# Patient Record
Sex: MALE | Race: WHITE | NOT HISPANIC OR LATINO | Employment: OTHER | URBAN - METROPOLITAN AREA
[De-identification: names, ages, dates, MRNs, and addresses within clinical notes are randomized per-mention and may not be internally consistent; named-entity substitution may affect disease eponyms.]

---

## 2020-11-11 ENCOUNTER — OFFICE VISIT (OUTPATIENT)
Dept: CARDIOLOGY | Facility: CLINIC | Age: 75
End: 2020-11-11
Payer: MEDICARE

## 2020-11-11 VITALS
SYSTOLIC BLOOD PRESSURE: 136 MMHG | HEIGHT: 74 IN | BODY MASS INDEX: 28.49 KG/M2 | OXYGEN SATURATION: 96 % | DIASTOLIC BLOOD PRESSURE: 80 MMHG | HEART RATE: 60 BPM | RESPIRATION RATE: 16 BRPM | WEIGHT: 222 LBS

## 2020-11-11 DIAGNOSIS — K21.9 GASTROESOPHAGEAL REFLUX DISEASE, UNSPECIFIED WHETHER ESOPHAGITIS PRESENT: ICD-10-CM

## 2020-11-11 DIAGNOSIS — N40.0 BENIGN PROSTATIC HYPERPLASIA, UNSPECIFIED WHETHER LOWER URINARY TRACT SYMPTOMS PRESENT: ICD-10-CM

## 2020-11-11 DIAGNOSIS — E78.5 DYSLIPIDEMIA: Primary | ICD-10-CM

## 2020-11-11 PROCEDURE — 1101F PR PT FALLS ASSESS DOC 0-1 FALLS W/OUT INJ PAST YR: ICD-10-PCS | Mod: CPTII,S$GLB,, | Performed by: INTERNAL MEDICINE

## 2020-11-11 PROCEDURE — 1159F MED LIST DOCD IN RCRD: CPT | Mod: S$GLB,,, | Performed by: INTERNAL MEDICINE

## 2020-11-11 PROCEDURE — 1101F PT FALLS ASSESS-DOCD LE1/YR: CPT | Mod: CPTII,S$GLB,, | Performed by: INTERNAL MEDICINE

## 2020-11-11 PROCEDURE — 1159F PR MEDICATION LIST DOCUMENTED IN MEDICAL RECORD: ICD-10-PCS | Mod: S$GLB,,, | Performed by: INTERNAL MEDICINE

## 2020-11-11 PROCEDURE — 99213 OFFICE O/P EST LOW 20 MIN: CPT | Mod: S$GLB,,, | Performed by: INTERNAL MEDICINE

## 2020-11-11 PROCEDURE — 1126F PR PAIN SEVERITY QUANTIFIED, NO PAIN PRESENT: ICD-10-PCS | Mod: S$GLB,,, | Performed by: INTERNAL MEDICINE

## 2020-11-11 PROCEDURE — 99213 PR OFFICE/OUTPT VISIT, EST, LEVL III, 20-29 MIN: ICD-10-PCS | Mod: S$GLB,,, | Performed by: INTERNAL MEDICINE

## 2020-11-11 PROCEDURE — 1126F AMNT PAIN NOTED NONE PRSNT: CPT | Mod: S$GLB,,, | Performed by: INTERNAL MEDICINE

## 2020-11-11 NOTE — PROGRESS NOTES
"  .  Subjective:    Patient ID:  Tadeo Solomon Jr. is a 75 y.o. male patient here for evaluation Hyperlipidemia and Gastroesophageal Reflux      History of Present Illness:    Mr. Solomon is doing very well denies having episodes of angina or shortness of breath PND orthopnea explained golf for 4 days a week walking on the golf course and doing house work as well effort capacity is good no cardiac symptoms are noted.  He has developed some are musculoskeletal pains associated with these including the joints of both upper extremities and lower extremities some paresthesias in the lower extremities certain positions while lying down.  But no effort induced angina symptoms are    Last labs from 2020 reviewed with patient - Cholesterol 164, triglycerides 75, HDL 64, VLDL 15, LDL 85; Hgb 14.6, Hct 43.5, Glucose 101, BUN 12, Cr 1, eGFR 74,    , K 4.6, AST 29, ALT 30.      Review of patient's allergies indicates:   Allergen Reactions    Lipitor [atorvastatin]      Myalgias      Pravastatin      Myalgias       Past Medical History:   Diagnosis Date    c Hypercholesterolemia     On Crestor 5 Mg qHS; Pravastatin And Lipitor Caused Myalgias    d Hyperglycemia     17 RXd Lifestyle Changes    g Family H/O Coagulopathy     His  Father Had This    i H/O Pneumonia In 2019     i MAHOGANY On CPAP     j GERD     j H/O Adenomatous Colon Polyps On 17 TC ###    Dr. Bradford Amador: "Repeat TC In 5 YRs"    k Benign Prostatic Hyperplasia ###    Dr. Loi Crystal    k Elevated PSA Level ###    Dr. Loi Crystal    l H/O Left Clavicle Fracture Repair SX In      l H/O Right Hammertoe Repair SX     l H/O Right Shoulder Arthroscopic SX In      n Anxiety     On Xanax 0.5 Mg Daily PRN    n Chronic Insomnia     On Ambien 10 Mg qHS PRN    o Allergic Rhinosinusitis     p H/O OD Vitrectomy SX     q Vitamin D Deficiency     17 RXd OTC D3 5K IU Daily    Wellness Visit 3/21/19      Past " Surgical History:   Procedure Laterality Date    CLAVICLE SURGERY  2003    left, fracture    EYE SURGERY      right- retina    SHOULDER ARTHROSCOPY  11/27/11    right     Social History     Tobacco Use    Smoking status: Never Smoker    Smokeless tobacco: Never Used   Substance Use Topics    Alcohol use: Yes     Comment: social    Drug use: No        Review of Systems:    As noted in HPI in addition      REVIEW OF SYSTEMS  CARDIOVASCULAR: No recent chest pain, palpitations, arm, neck, or jaw pain  RESPIRATORY: No recent fever, cough chills, SOB or congestion  : No blood in the urine  GI: No Nausea, vomiting, constipation, diarrhea, blood, or reflux.  MUSCULOSKELETAL: +myalgias  NEURO: No lightheadedness or dizziness  EYES: No Double vision, blurry, vision or headache              Objective:        Vitals:    11/11/20 1337   BP: 136/80   Pulse: 60   Resp: 16       LIPIDS - LAST 2   Lab Results   Component Value Date    CHOL 226 (H) 09/23/2019    CHOL 189 10/16/2018    HDL 62 09/23/2019    HDL 62 10/16/2018    LDLCALC 137 (H) 09/23/2019    LDLCALC 99 10/16/2018    TRIG 137 09/23/2019    TRIG 138 10/16/2018    CHOLHDL 2.6 11/18/2016    CHOLHDL 3.1 05/03/2013       CBC - LAST 2  Lab Results   Component Value Date    WBC 6.5 09/23/2019    WBC 4.0 10/16/2018    RBC 4.66 09/23/2019    RBC 4.88 10/16/2018    HGB 14.1 09/23/2019    HGB 14.7 10/16/2018    HCT 41.6 09/23/2019    HCT 44.9 10/16/2018    MCV 89 09/23/2019    MCV 92 10/16/2018    MCH 30.3 09/23/2019    MCH 30.1 10/16/2018    MCHC 33.9 09/23/2019    MCHC 32.7 10/16/2018    RDW 14.2 09/23/2019    RDW 13.6 10/16/2018     09/23/2019     10/16/2018    MPV 9.4 11/18/2016    GRAN 65 10/20/2017    LYMPH 29 09/23/2019    LYMPH 1.9 09/23/2019    MONO 8 09/23/2019    MONO 0.5 09/23/2019    BASO 0.1 09/23/2019    BASO 0.1 10/16/2018       CHEMISTRY & LIVER FUNCTION - LAST 2  Lab Results   Component Value Date     09/23/2019     10/16/2018     K 4.8 2019    K 4.8 10/16/2018     2019     10/16/2018    CO2 22 2019    CO2 22 10/16/2018    BUN 13 2019    BUN 14 10/16/2018    CREATININE 1.10 2019    CREATININE 0.99 10/16/2018    GLU 86 2019    GLU 99 10/16/2018    CALCIUM 9.9 2019    CALCIUM 9.6 10/16/2018    ALBUMIN 4.3 2019    ALBUMIN 4.2 10/16/2018    PROT 6.8 2019    PROT 7.1 10/16/2018    ALKPHOS 69 2019    ALKPHOS 75 10/16/2018    ALT 24 2019    ALT 19 10/16/2018    AST 21 2019    AST 23 10/16/2018    BILITOT 0.5 2019    BILITOT 0.4 10/16/2018        CARDIAC PROFILE - LAST 2  No results found for: BNP, CPK, CPKMB, LDH, TROPONINI     COAGULATION - LAST 2  No results found for: LABPT, INR, APTT    ENDOCRINE & PSA - LAST 2  Lab Results   Component Value Date    HGBA1C 5.6 2019    HGBA1C 5.5 10/16/2018    TSH 2.450 2019    TSH 2.110 10/16/2018        ECHOCARDIOGRAM RESULTS  No results found for this or any previous visit.    CURRENT/PREVIOUS VISIT EKG  Results for orders placed or performed during the hospital encounter of 19   EKG 12-lead    Collection Time: 19  9:12 PM    [facility]                                                                                  Test Date:    2019  Pat Name:     DIMITRI REGALADO             Department:     Patient ID:   [id number]                  Room:         EXAM 01EXAM 01  Gender:       Male                     Technician:   joycelyn  :          1945               Requested By:   Order Number:                          Reading MD:   Scott Medley MD                                   Measurements  Intervals                              Axis            Rate:         76                       P:            51  VA:           168                      QRS:          22  QRSD:         86                       T:            77  QT:           374                                       QTc:          420                                                                 Interpretive Statements  Normal sinus rhythm  Normal ECG  No previous ECG available for comparison    Electronically Signed On 4-5-2019 9:00:21 CDT by Scott Medley MD         PHYSICAL EXAM  CONSTITUTIONAL: Well built, well nourished in no apparent distress  NECK: no carotid bruit, no JVD  LUNGS: CTA  CHEST WALL: no tenderness  HEART: regular rate and rhythm, S1, S2 normal, no murmur, click, rub or gallop   ABDOMEN: soft, non-tender; bowel sounds normal; no masses,  no organomegaly  EXTREMITIES: Extremities normal, no edema, no calf tenderness noted  NEURO: AAO X 3    I HAVE REVIEWED :    The vital signs, nurses notes, and all the pertinent radiology and labs.         Current Outpatient Medications   Medication Instructions    ALPRAZolam (XANAX) 0.5 mg, Oral, Daily PRN    aspirin (ECOTRIN) 81 mg, Daily    cetirizine (ZYRTEC) 10 mg, Oral, Nightly PRN    cholecalciferol (vitamin D3) 5,000 Units, Oral, Daily    coenzyme Q10 400 mg, Oral, 2 times daily    finasteride (PROSCAR) 5 mg tablet TK 1 T PO D    multivitamin (ONE DAILY MULTIVITAMIN) per tablet 1 tablet, Oral, Daily    pantoprazole (PROTONIX) 40 MG tablet TAKE 1 TABLET EVERY DAY    rosuvastatin (CRESTOR) 5 MG tablet TAKE 1 TABLET EVERY DAY    tamsulosin (FLOMAX) 0.4 mg Cap TAKE 1 CAPSULE EVERY DAY    zolpidem (AMBIEN) 10 mg Tab TAKE 1 TABLET(10 MG) BY MOUTH EVERY NIGHT AS NEEDED          Assessment:       1.  Dyslipidemia  2.  BPH  3.  History of GERD      Plan:     From a cardiac standpoint, patient is doing well.    Continue crestor 5mg daily.   His LDL cholesterol is down to 85 non-HDL is 100.  Rest of the labs appear good including CBC and CMP.  Continue on low-fat low-cholesterol diet activity as tolerated.  Continue protonix 40mg daily, proscar and flomax at current regimen  Repeat his lipid levels in about 6 months for maintenance.        No  follow-ups on file.

## 2021-01-09 ENCOUNTER — IMMUNIZATION (OUTPATIENT)
Dept: FAMILY MEDICINE | Facility: CLINIC | Age: 76
End: 2021-01-09
Payer: MEDICARE

## 2021-01-09 DIAGNOSIS — Z23 NEED FOR VACCINATION: ICD-10-CM

## 2021-01-09 PROCEDURE — 91300 COVID-19, MRNA, LNP-S, PF, 30 MCG/0.3 ML DOSE VACCINE: CPT | Mod: PBBFAC | Performed by: FAMILY MEDICINE

## 2021-01-09 PROCEDURE — 0001A COVID-19, MRNA, LNP-S, PF, 30 MCG/0.3 ML DOSE VACCINE: ICD-10-PCS | Mod: CV19,,, | Performed by: FAMILY MEDICINE

## 2021-01-09 PROCEDURE — 91300 COVID-19, MRNA, LNP-S, PF, 30 MCG/0.3 ML DOSE VACCINE: ICD-10-PCS | Mod: ,,, | Performed by: FAMILY MEDICINE

## 2021-01-09 PROCEDURE — 0001A COVID-19, MRNA, LNP-S, PF, 30 MCG/0.3 ML DOSE VACCINE: CPT | Mod: CV19,,, | Performed by: FAMILY MEDICINE

## 2021-01-30 ENCOUNTER — IMMUNIZATION (OUTPATIENT)
Dept: FAMILY MEDICINE | Facility: CLINIC | Age: 76
End: 2021-01-30
Payer: MEDICARE

## 2021-01-30 DIAGNOSIS — Z23 NEED FOR VACCINATION: Primary | ICD-10-CM

## 2021-01-30 PROCEDURE — 0002A COVID-19, MRNA, LNP-S, PF, 30 MCG/0.3 ML DOSE VACCINE: CPT | Mod: PBBFAC | Performed by: INTERNAL MEDICINE

## 2021-01-30 PROCEDURE — 91300 COVID-19, MRNA, LNP-S, PF, 30 MCG/0.3 ML DOSE VACCINE: CPT | Mod: PBBFAC | Performed by: INTERNAL MEDICINE

## 2021-03-25 ENCOUNTER — TELEPHONE (OUTPATIENT)
Dept: CARDIOLOGY | Facility: CLINIC | Age: 76
End: 2021-03-25

## 2021-05-12 ENCOUNTER — OFFICE VISIT (OUTPATIENT)
Dept: CARDIOLOGY | Facility: CLINIC | Age: 76
End: 2021-05-12
Payer: MEDICARE

## 2021-05-12 VITALS
WEIGHT: 218 LBS | HEIGHT: 74 IN | BODY MASS INDEX: 27.98 KG/M2 | RESPIRATION RATE: 16 BRPM | OXYGEN SATURATION: 98 % | HEART RATE: 78 BPM | DIASTOLIC BLOOD PRESSURE: 78 MMHG | SYSTOLIC BLOOD PRESSURE: 120 MMHG

## 2021-05-12 DIAGNOSIS — E78.00 HYPERCHOLESTEROLEMIA: ICD-10-CM

## 2021-05-12 DIAGNOSIS — G47.33 OSA ON CPAP: ICD-10-CM

## 2021-05-12 DIAGNOSIS — N40.0 BENIGN PROSTATIC HYPERPLASIA WITHOUT LOWER URINARY TRACT SYMPTOMS: ICD-10-CM

## 2021-05-12 PROCEDURE — 1159F MED LIST DOCD IN RCRD: CPT | Mod: S$GLB,,, | Performed by: INTERNAL MEDICINE

## 2021-05-12 PROCEDURE — 1126F PR PAIN SEVERITY QUANTIFIED, NO PAIN PRESENT: ICD-10-PCS | Mod: S$GLB,,, | Performed by: INTERNAL MEDICINE

## 2021-05-12 PROCEDURE — 3288F PR FALLS RISK ASSESSMENT DOCUMENTED: ICD-10-PCS | Mod: CPTII,S$GLB,, | Performed by: INTERNAL MEDICINE

## 2021-05-12 PROCEDURE — 1126F AMNT PAIN NOTED NONE PRSNT: CPT | Mod: S$GLB,,, | Performed by: INTERNAL MEDICINE

## 2021-05-12 PROCEDURE — 1101F PR PT FALLS ASSESS DOC 0-1 FALLS W/OUT INJ PAST YR: ICD-10-PCS | Mod: CPTII,S$GLB,, | Performed by: INTERNAL MEDICINE

## 2021-05-12 PROCEDURE — 99213 OFFICE O/P EST LOW 20 MIN: CPT | Mod: S$GLB,,, | Performed by: INTERNAL MEDICINE

## 2021-05-12 PROCEDURE — 3288F FALL RISK ASSESSMENT DOCD: CPT | Mod: CPTII,S$GLB,, | Performed by: INTERNAL MEDICINE

## 2021-05-12 PROCEDURE — 1101F PT FALLS ASSESS-DOCD LE1/YR: CPT | Mod: CPTII,S$GLB,, | Performed by: INTERNAL MEDICINE

## 2021-05-12 PROCEDURE — 1159F PR MEDICATION LIST DOCUMENTED IN MEDICAL RECORD: ICD-10-PCS | Mod: S$GLB,,, | Performed by: INTERNAL MEDICINE

## 2021-05-12 PROCEDURE — 99213 PR OFFICE/OUTPT VISIT, EST, LEVL III, 20-29 MIN: ICD-10-PCS | Mod: S$GLB,,, | Performed by: INTERNAL MEDICINE

## 2021-11-03 ENCOUNTER — OFFICE VISIT (OUTPATIENT)
Dept: CARDIOLOGY | Facility: CLINIC | Age: 76
End: 2021-11-03
Payer: MEDICARE

## 2021-11-03 VITALS
HEIGHT: 74 IN | RESPIRATION RATE: 16 BRPM | WEIGHT: 223 LBS | BODY MASS INDEX: 28.62 KG/M2 | HEART RATE: 72 BPM | DIASTOLIC BLOOD PRESSURE: 84 MMHG | SYSTOLIC BLOOD PRESSURE: 122 MMHG | OXYGEN SATURATION: 96 %

## 2021-11-03 DIAGNOSIS — E78.5 DYSLIPIDEMIA: Primary | ICD-10-CM

## 2021-11-03 PROCEDURE — 1160F PR REVIEW ALL MEDS BY PRESCRIBER/CLIN PHARMACIST DOCUMENTED: ICD-10-PCS | Mod: CPTII,S$GLB,, | Performed by: INTERNAL MEDICINE

## 2021-11-03 PROCEDURE — 1160F RVW MEDS BY RX/DR IN RCRD: CPT | Mod: CPTII,S$GLB,, | Performed by: INTERNAL MEDICINE

## 2021-11-03 PROCEDURE — 3074F PR MOST RECENT SYSTOLIC BLOOD PRESSURE < 130 MM HG: ICD-10-PCS | Mod: CPTII,S$GLB,, | Performed by: INTERNAL MEDICINE

## 2021-11-03 PROCEDURE — 1126F AMNT PAIN NOTED NONE PRSNT: CPT | Mod: CPTII,S$GLB,, | Performed by: INTERNAL MEDICINE

## 2021-11-03 PROCEDURE — 1101F PT FALLS ASSESS-DOCD LE1/YR: CPT | Mod: CPTII,S$GLB,, | Performed by: INTERNAL MEDICINE

## 2021-11-03 PROCEDURE — 99213 PR OFFICE/OUTPT VISIT, EST, LEVL III, 20-29 MIN: ICD-10-PCS | Mod: S$GLB,,, | Performed by: INTERNAL MEDICINE

## 2021-11-03 PROCEDURE — 3074F SYST BP LT 130 MM HG: CPT | Mod: CPTII,S$GLB,, | Performed by: INTERNAL MEDICINE

## 2021-11-03 PROCEDURE — 1101F PR PT FALLS ASSESS DOC 0-1 FALLS W/OUT INJ PAST YR: ICD-10-PCS | Mod: CPTII,S$GLB,, | Performed by: INTERNAL MEDICINE

## 2021-11-03 PROCEDURE — 3079F DIAST BP 80-89 MM HG: CPT | Mod: CPTII,S$GLB,, | Performed by: INTERNAL MEDICINE

## 2021-11-03 PROCEDURE — 3288F FALL RISK ASSESSMENT DOCD: CPT | Mod: CPTII,S$GLB,, | Performed by: INTERNAL MEDICINE

## 2021-11-03 PROCEDURE — 1159F PR MEDICATION LIST DOCUMENTED IN MEDICAL RECORD: ICD-10-PCS | Mod: CPTII,S$GLB,, | Performed by: INTERNAL MEDICINE

## 2021-11-03 PROCEDURE — 99213 OFFICE O/P EST LOW 20 MIN: CPT | Mod: S$GLB,,, | Performed by: INTERNAL MEDICINE

## 2021-11-03 PROCEDURE — 3079F PR MOST RECENT DIASTOLIC BLOOD PRESSURE 80-89 MM HG: ICD-10-PCS | Mod: CPTII,S$GLB,, | Performed by: INTERNAL MEDICINE

## 2021-11-03 PROCEDURE — 1159F MED LIST DOCD IN RCRD: CPT | Mod: CPTII,S$GLB,, | Performed by: INTERNAL MEDICINE

## 2021-11-03 PROCEDURE — 1126F PR PAIN SEVERITY QUANTIFIED, NO PAIN PRESENT: ICD-10-PCS | Mod: CPTII,S$GLB,, | Performed by: INTERNAL MEDICINE

## 2021-11-03 PROCEDURE — 3288F PR FALLS RISK ASSESSMENT DOCUMENTED: ICD-10-PCS | Mod: CPTII,S$GLB,, | Performed by: INTERNAL MEDICINE

## 2022-01-25 PROBLEM — E78.5 DYSLIPIDEMIA: Status: RESOLVED | Noted: 2021-11-03 | Resolved: 2022-01-25

## 2022-03-01 PROBLEM — I77.811 AORTIC ECTASIA, ABDOMINAL: Status: ACTIVE | Noted: 2022-03-01

## 2022-03-11 ENCOUNTER — OFFICE VISIT (OUTPATIENT)
Dept: PODIATRY | Facility: CLINIC | Age: 77
End: 2022-03-11
Payer: MEDICARE

## 2022-03-11 VITALS — HEIGHT: 74 IN | WEIGHT: 227.06 LBS | BODY MASS INDEX: 29.14 KG/M2

## 2022-03-11 DIAGNOSIS — M72.2 PLANTAR FASCIAL FIBROMATOSIS: ICD-10-CM

## 2022-03-11 PROCEDURE — 1159F MED LIST DOCD IN RCRD: CPT | Mod: CPTII,S$GLB,, | Performed by: PODIATRIST

## 2022-03-11 PROCEDURE — 99203 PR OFFICE/OUTPT VISIT, NEW, LEVL III, 30-44 MIN: ICD-10-PCS | Mod: S$GLB,,, | Performed by: PODIATRIST

## 2022-03-11 PROCEDURE — 99999 PR PBB SHADOW E&M-EST. PATIENT-LVL III: ICD-10-PCS | Mod: PBBFAC,,, | Performed by: PODIATRIST

## 2022-03-11 PROCEDURE — 99203 OFFICE O/P NEW LOW 30 MIN: CPT | Mod: S$GLB,,, | Performed by: PODIATRIST

## 2022-03-11 PROCEDURE — 1126F PR PAIN SEVERITY QUANTIFIED, NO PAIN PRESENT: ICD-10-PCS | Mod: CPTII,S$GLB,, | Performed by: PODIATRIST

## 2022-03-11 PROCEDURE — 1159F PR MEDICATION LIST DOCUMENTED IN MEDICAL RECORD: ICD-10-PCS | Mod: CPTII,S$GLB,, | Performed by: PODIATRIST

## 2022-03-11 PROCEDURE — 1126F AMNT PAIN NOTED NONE PRSNT: CPT | Mod: CPTII,S$GLB,, | Performed by: PODIATRIST

## 2022-03-11 PROCEDURE — 99999 PR PBB SHADOW E&M-EST. PATIENT-LVL III: CPT | Mod: PBBFAC,,, | Performed by: PODIATRIST

## 2022-03-12 NOTE — PROGRESS NOTES
Subjective:      Patient ID: Tadeo Solomon Jr. is a 76 y.o. male.    Chief Complaint: No chief complaint on file.    Tadeo is a 76 y.o. male with a past medical history of a Mild Abdominal Aortic Ectasia, c Hypercholesterolemia, d Hyperglycemia, g Family H/O Coagulopathy, i H/O Pneumonia In 04/2019, i MAHOGANY On CPAP (###), j GERD, j H/O Adenomatous Colon Polyps On 6/16/17 TC (###), k Benign Prostatic Hyperplasia (###), k Elevated PSA Levels (###), l H/O Left Clavicle Fracture Repair SX In 2003, l H/O Right Hammertoe Repair SX, l H/O Right Shoulder Arthroscopic SX In 2011, l Right Foot Plantar Cystic Mass, n Chronic Insomnia, n Generalized Anxiety, o Allergic Rhinosinusitis, p H/O OD Vitrectomy, q Vitamin D Deficiency, and Wellness 1/24/202022. The patient's chief complaint consists of a plantar mass of the Rt. Medial forefoot that was first noticed over three weeks ago.  Upon being seen by family medicine, he was prompted to have this evaluated.  Denies this being an overt source of pain.  He is able to ambulate pain free in casual shoe gear.  He believes there is another, albeit smaller, mass along the Lt. Medial forefoot as well.  Inquires as to whether further intervention is warranted.  Denies any additional pedal complaints.      Hemoglobin A1C   Date Value Ref Range Status   09/23/2019 5.6 4.8 - 5.6 % Final     Comment:              Prediabetes: 5.7 - 6.4           Diabetes: >6.4           Glycemic control for adults with diabetes: <7.0     10/16/2018 5.5 4.8 - 5.6 % Final     Comment:              Prediabetes: 5.7 - 6.4           Diabetes: >6.4           Glycemic control for adults with diabetes: <7.0     10/20/2017 5.6 4.8 - 5.6 % Final     Comment:              Pre-diabetes: 5.7 - 6.4           Diabetes: >6.4           Glycemic control for adults with diabetes: <7.0       Hemoglobin A1c   Date Value Ref Range Status   01/19/2022 5.8 (H) 4.8 - 5.6 % Final     Comment:              Prediabetes: 5.7 - 6.4            Diabetes: >6.4           Glycemic control for adults with diabetes: <7.0     01/13/2021 5.7 (H) 4.8 - 5.6 % Final     Comment:              Prediabetes: 5.7 - 6.4           Diabetes: >6.4           Glycemic control for adults with diabetes: <7.0         Review of Systems   Constitutional: Negative for chills and fever.   Cardiovascular: Negative for claudication and leg swelling.   Skin: Negative for color change and nail changes.   Musculoskeletal: Negative for joint pain, joint swelling, muscle cramps and muscle weakness.   Gastrointestinal: Negative for nausea and vomiting.   Neurological: Negative for numbness and paresthesias.   Psychiatric/Behavioral: Negative for altered mental status.           Objective:      Physical Exam  Constitutional:       Appearance: Normal appearance. He is not ill-appearing.   Cardiovascular:      Pulses:           Dorsalis pedis pulses are 2+ on the right side and 2+ on the left side.        Posterior tibial pulses are 2+ on the right side and 2+ on the left side.      Comments: CFT is < 3 seconds bilateral.  Pedal hair growth is present bilateral.  No lower extremity edema noted bilateral.  Toes are warm to touch bilateral.    Musculoskeletal:         General: No tenderness, deformity or signs of injury.      Right lower leg: No edema.      Left lower leg: No edema.      Comments: Muscle strength 5/5 in all muscle groups bilateral.  No tenderness nor crepitation with ROM of foot/ankle joints bilateral.  No tenderness with palpation of bilateral foot and ankle.  Bilateral semi-reducible contracture of toes 2-5.  Bilateral HAV.  Bilateral plantar fibromas along the distal medial band of the plantar fascia.  Both masses are non-painful to touch, non-mobile, and firm in consistency.  Rt. Mass measures 1 x 1cm.  Lt. Mass measures 0.5 x 0.5cm   Skin:     General: Skin is warm and dry.      Capillary Refill: Capillary refill takes 2 to 3 seconds.      Findings: No bruising,  ecchymosis, erythema, signs of injury, laceration, lesion, petechiae, rash or wound.      Comments: Pedal skin has normal turgor, temperature, and texture bilateral.  Bilateral plantar fibromas have normal skin coloration.  Toenails x 10 appear normotrophic. Examination of the skin reveals no evidence of significant maceration, rashes, open lesions, suspicious appearing nevi or other concerning lesions.    Neurological:      General: No focal deficit present.      Mental Status: He is alert.      Sensory: No sensory deficit.      Motor: No weakness or atrophy.      Comments: Light touch is intact bilateral.                 Assessment:       Encounter Diagnosis   Name Primary?    Plantar fascial fibromatosis          Plan:       Diagnoses and all orders for this visit:    Plantar fascial fibromatosis  Comments:  referral placed for biopsy  Orders:  -     Ambulatory referral/consult to Podiatry      I counseled the patient on his conditions, their implications and medical management.    Based on today's exam, he has bilateral plantar fibromas invested along the medial band of plantar fascia.      Being that these masses are asymptomatic, no further intervention is required.    Advised to monitor the masses for overt changes in skin coloration, size, and pain.      Should the masses become painful with weight bearing, we discussed topical verapamil and steroid injections.      We could also consider a custom molded orthotic with a donut hole about the site of said masses.      Discouraged from surgical excision, as there is a >70% recurrence rate.    RTC prn.    Andrey Madden DPM

## 2022-10-17 ENCOUNTER — OFFICE VISIT (OUTPATIENT)
Dept: URGENT CARE | Facility: CLINIC | Age: 77
End: 2022-10-17
Payer: MEDICARE

## 2022-10-17 VITALS
SYSTOLIC BLOOD PRESSURE: 135 MMHG | WEIGHT: 219 LBS | DIASTOLIC BLOOD PRESSURE: 89 MMHG | HEART RATE: 74 BPM | RESPIRATION RATE: 18 BRPM | BODY MASS INDEX: 28.11 KG/M2 | OXYGEN SATURATION: 96 % | TEMPERATURE: 98 F | HEIGHT: 74 IN

## 2022-10-17 DIAGNOSIS — R52 BODY ACHES: Primary | ICD-10-CM

## 2022-10-17 DIAGNOSIS — U07.1 COVID-19: ICD-10-CM

## 2022-10-17 DIAGNOSIS — U07.1 COVID-19 VIRUS DETECTED: ICD-10-CM

## 2022-10-17 LAB
CTP QC/QA: YES
SARS-COV-2 RDRP RESP QL NAA+PROBE: POSITIVE

## 2022-10-17 PROCEDURE — 87635 SARS-COV-2 COVID-19 AMP PRB: CPT | Mod: QW,S$GLB,, | Performed by: EMERGENCY MEDICINE

## 2022-10-17 PROCEDURE — 1159F MED LIST DOCD IN RCRD: CPT | Mod: CPTII,S$GLB,, | Performed by: EMERGENCY MEDICINE

## 2022-10-17 PROCEDURE — 3079F PR MOST RECENT DIASTOLIC BLOOD PRESSURE 80-89 MM HG: ICD-10-PCS | Mod: CPTII,S$GLB,, | Performed by: EMERGENCY MEDICINE

## 2022-10-17 PROCEDURE — 1125F AMNT PAIN NOTED PAIN PRSNT: CPT | Mod: CPTII,S$GLB,, | Performed by: EMERGENCY MEDICINE

## 2022-10-17 PROCEDURE — 1159F PR MEDICATION LIST DOCUMENTED IN MEDICAL RECORD: ICD-10-PCS | Mod: CPTII,S$GLB,, | Performed by: EMERGENCY MEDICINE

## 2022-10-17 PROCEDURE — 1160F RVW MEDS BY RX/DR IN RCRD: CPT | Mod: CPTII,S$GLB,, | Performed by: EMERGENCY MEDICINE

## 2022-10-17 PROCEDURE — 3079F DIAST BP 80-89 MM HG: CPT | Mod: CPTII,S$GLB,, | Performed by: EMERGENCY MEDICINE

## 2022-10-17 PROCEDURE — 1160F PR REVIEW ALL MEDS BY PRESCRIBER/CLIN PHARMACIST DOCUMENTED: ICD-10-PCS | Mod: CPTII,S$GLB,, | Performed by: EMERGENCY MEDICINE

## 2022-10-17 PROCEDURE — 3075F PR MOST RECENT SYSTOLIC BLOOD PRESS GE 130-139MM HG: ICD-10-PCS | Mod: CPTII,S$GLB,, | Performed by: EMERGENCY MEDICINE

## 2022-10-17 PROCEDURE — 87635: ICD-10-PCS | Mod: QW,S$GLB,, | Performed by: EMERGENCY MEDICINE

## 2022-10-17 PROCEDURE — 99203 PR OFFICE/OUTPT VISIT, NEW, LEVL III, 30-44 MIN: ICD-10-PCS | Mod: S$GLB,,, | Performed by: EMERGENCY MEDICINE

## 2022-10-17 PROCEDURE — 3075F SYST BP GE 130 - 139MM HG: CPT | Mod: CPTII,S$GLB,, | Performed by: EMERGENCY MEDICINE

## 2022-10-17 PROCEDURE — 1125F PR PAIN SEVERITY QUANTIFIED, PAIN PRESENT: ICD-10-PCS | Mod: CPTII,S$GLB,, | Performed by: EMERGENCY MEDICINE

## 2022-10-17 PROCEDURE — 99203 OFFICE O/P NEW LOW 30 MIN: CPT | Mod: S$GLB,,, | Performed by: EMERGENCY MEDICINE

## 2022-10-17 RX ORDER — ASPIRIN 81 MG/1
TABLET ORAL
COMMUNITY

## 2022-10-17 RX ORDER — AZELASTINE HCL 205.5 UG/1
SPRAY NASAL
COMMUNITY

## 2022-10-17 NOTE — PROGRESS NOTES
"Subjective:       Patient ID: Tadeo Solomon Jr. is a 77 y.o. male.    Vitals:  height is 6' 2" (1.88 m) and weight is 99.3 kg (219 lb). His temperature is 98.2 °F (36.8 °C). His blood pressure is 135/89 and his pulse is 74. His respiration is 18 and oxygen saturation is 96%.     Chief Complaint: Sinus Problem    Patient presents today with complaints of sinus congestion, nasal drip,body aches & HA since yesterday. Patient is currently taking claritan for his symptoms with no relief. Patient reports he just returned from cruise from [place] and passengers tested positive for Covid. Patient is Covid-19 vaccinated.     Sinus Problem  This is a new problem. The current episode started yesterday. The problem is unchanged. There has been no fever. His pain is at a severity of 1/10.   ROS    Objective:      Physical Exam   Constitutional: He is oriented to person, place, and time. He appears well-developed. He is cooperative.  Non-toxic appearance. He does not appear ill. No distress.   HENT:   Head: Normocephalic and atraumatic.   Ears:   Right Ear: Hearing and external ear normal.   Left Ear: Hearing and external ear normal.   Nose: Nose normal. No mucosal edema, rhinorrhea or nasal deformity. No epistaxis. Right sinus exhibits no maxillary sinus tenderness and no frontal sinus tenderness. Left sinus exhibits no maxillary sinus tenderness and no frontal sinus tenderness.   Mouth/Throat: Uvula is midline, oropharynx is clear and moist and mucous membranes are normal. Mucous membranes are moist. No trismus in the jaw. Normal dentition. No uvula swelling. No oropharyngeal exudate, posterior oropharyngeal edema or posterior oropharyngeal erythema. Oropharynx is clear.   Eyes: Conjunctivae and lids are normal. No scleral icterus.   Neck: Trachea normal and phonation normal. Neck supple. No edema present. No erythema present. No neck rigidity present.   Cardiovascular: Normal rate, regular rhythm, normal heart sounds and " normal pulses.   Pulmonary/Chest: Effort normal. No respiratory distress. He has no decreased breath sounds. He has no wheezes. He has no rhonchi. He has no rales.   Abdominal: Normal appearance.   Musculoskeletal: Normal range of motion.         General: No deformity. Normal range of motion.   Neurological: no focal deficit. He is alert and oriented to person, place, and time. He exhibits normal muscle tone. Coordination normal.   Skin: Skin is warm, dry, intact, not diaphoretic and not pale.   Psychiatric: His speech is normal and behavior is normal. Mood, judgment and thought content normal.   Nursing note and vitals reviewed.        Results for orders placed or performed in visit on 10/17/22   POCT COVID-19 Rapid Screening   Result Value Ref Range    POC Rapid COVID Positive (A) Negative     Acceptable Yes      Patient exposed to COVID on a cruise.  He has nasal congestion.  Will do antiviral    Assessment:       1. Body aches    2. COVID-19            Plan:         Body aches  -     POCT COVID-19 Rapid Screening  -     Cancel: POCT Influenza A/B MOLECULAR    COVID-19  -     molnupiravir 200 mg capsule (EUA); Take 4 capsules (800 mg total) by mouth every 12 (twelve) hours. for 5 days  Dispense: 40 capsule; Refill: 0

## 2023-04-12 ENCOUNTER — DOCUMENTATION ONLY (OUTPATIENT)
Dept: ADMINISTRATIVE | Facility: OTHER | Age: 78
End: 2023-04-12
Payer: MEDICARE

## 2023-04-13 NOTE — PROGRESS NOTES
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  Diagnosis in 2023.   Group clinical stage IIB  cT1c  cN0  cM0.  Jesi score of 3+4=7.  PSA =5.5.   REFERRING PHYSICIAN: Loi Crystal M.D.   DATE OF CONSULTATION: 2023     PRESENT ILLNESS:  The patient is a 77-year-old  white male.  He has a history of benign prostatic hypertrophy.  Screening PSA in 2019, was 3.6.  In 2021, PSA was 3.9.  On  of this year, PSA was 5.5.     According to the patient, all of the digital rectal examinations from Dr. Crystal have been negative for nodules, although the prostate is known to be enlarged.     On  of this year, an MR scan of the prostate was performed with and without IV contrast.  I have reviewed the images, as well as read the radiologist's report.  Prostate size is 108 cc.  There is one highly concerning (PI-RADS 4/5) lesion.  This lesion is a 12 mm subcapsular plaque-like lesion in the left peripheral zone.  There is one minimally concerning additional lesion which is a 6 mm lesion.  There is no extracapsular invasion.  There is no seminal vesicle involvement.  There is no lymphadenopathy.     On  of this year, transrectal ultrasound showed an 80 cc size prostate.  Transrectal biopsy of the left prostate showed 2/3 sextants were positive.  The left base showed adenocarcinoma, [place] score of 3+4=7.  Tumor involved 2/4 cores and tumor involved approximately 10% of the tissue.  There was no lymphovascular or perineural invasion.  Jesi 4 component accounted for 10%.  The left mid biopsy also showed [place] score of 3+4=7 involving 1/4 cores.  Tumor involved 30% of the specimen.  [place] 4 component involved approximately 10% of the total.  0/3 right prostate sextant biopsies were positive.     On April 3, a bone scan was negative.     On , the patient spoke  with Dr. Crystal about possible options.  The patient has been referred for Radiation Oncology consultation.     The patient denies any symptoms.  His International Prostate Symptom score is 8 which corresponds with only very mild symptoms.  The patient is on Flomax at the current time or this International Prostate Symptom score likely would be higher.  The patient is pleased with his quality of life due to urinary symptoms.  The patient's Sexual Health Inventory for Men score is 11 which corresponds with moderate erectile dysfunction.  The patient is in good health and remains very active.  He has no significant limitations.  His weight is stable at 220 pounds.    PAST MEDICAL HISTORY:  The patient's filled-out forms include symptoms, as well as past medical history and relevant factors have been reviewed.     Medical:  Adenocarcinoma of the prostate, hypercholesterol.     No previous radiation therapy or chemotherapy history.     PREVIOUS SURGERIES:  Rotator cuff surgery, retina surgery, hammertoe surgery.    FAMILY HISTORY:  Positive for a brother with melanoma.    TOBACCO HISTORY:  Negative.    ALCOHOL:  The patient has social alcohol use.    ALLERGIES:  No known allergies.    MEDICATIONS:  The patient is on several medications and they are recorded in the clinic records.  Medications include Flomax.    PHYSICAL EXAMINATION:  GENERAL:  Shows a man in no distress.  He has an excellent performance status (performance score =0).    LYMPH NODES:  There is no supraclavicular or groin lymphadenopathy.    ABDOMEN:  Shows no masses.  There are no abdominal surgical scars.    DIGITAL RECTAL:  Shows a large gland consistent with the 108 cc suggested on MR scan.  There are no suspicious nodules.  There is no extracapsular extension.  There are no intrinsic rectal lesions.    LAB AND X-RAYS:  See Present Illness section.    ASSESSMENT/GOALS/PLAN:  The patient recently has been diagnosed with favorable intermediate risk  adenocarcinoma of the prostate.  Diagnosis in March 2023.   Group clinical stage IIB  cT1c  cN0  cM0.  [place] score of 3+4=7.  PSA =5.5.    I have reviewed the NCCN guidelines for appropriate workup and treatment.  The average 77-year-old man such as this has an additional nine years expected survival, although this patient is in very good shape and his expected survival likely is higher than nine years.  According to the NCCN guidelines, no additional workup is required at this time.  I spoke with the patient about the possibility of a molecular testing  on the biopsy; however, he has declined this.      According to the NCCN guidelines, patients with favorable intermediate risk adenocarcinoma of the prostate with greater than 10 years expected survival have an option of active surveillance, irradiation or surgery.  I had a lengthy discussion with this patient and his wife about these options.  After this discussion, they have elected for active surveillance, which I think is an appropriate option.  The patient was given a copy of things that are often done with active surveillance.  The patient will continue to see Dr. Crystal on a regular basis.  I have not given the patient a follow up appointment with me, but I am happy to see him in the future if I could be of assistance.     Greater than 105 minutes was spent on this consultation, of which 30 minutes was spent in record review, 60 minutes was spent directly with the patient and his wife, and 15 minutes was spent on documentation and guideline review.     I would like to thank all of Mr. Solomon's physicians for the opportunity to consult on their patients.  Any of these physicians should feel free to call me with any questions or comments.        Electronically Signed By:  LATONIA Wells/Maryse  DD:  04/13/2023  DT:  04/13/2023  Job #:  [id number]    CC:  Chava Bell M.D., [address] [place] [address]  [place], [place] [address], Fax: [phone]        Moises Dorsey M.D., [address] [facility], [facility], [place] [address], Fax: [phone]

## 2024-01-25 PROBLEM — E11.65 HYPERGLYCEMIA DUE TO DIABETES MELLITUS: Status: ACTIVE | Noted: 2024-01-25
